# Patient Record
Sex: MALE | Race: WHITE | Employment: OTHER | URBAN - METROPOLITAN AREA
[De-identification: names, ages, dates, MRNs, and addresses within clinical notes are randomized per-mention and may not be internally consistent; named-entity substitution may affect disease eponyms.]

---

## 2021-06-06 ENCOUNTER — HOSPITAL ENCOUNTER (EMERGENCY)
Age: 46
Discharge: HOME | End: 2021-06-06
Attending: EMERGENCY MEDICINE

## 2021-06-06 ENCOUNTER — APPOINTMENT (OUTPATIENT)
Dept: GENERAL RADIOLOGY | Age: 46
End: 2021-06-06

## 2021-06-06 VITALS
TEMPERATURE: 98.1 F | SYSTOLIC BLOOD PRESSURE: 152 MMHG | WEIGHT: 250 LBS | HEIGHT: 72 IN | BODY MASS INDEX: 33.86 KG/M2 | RESPIRATION RATE: 18 BRPM | HEART RATE: 83 BPM | DIASTOLIC BLOOD PRESSURE: 94 MMHG | OXYGEN SATURATION: 97 %

## 2021-06-06 DIAGNOSIS — S60.459A FOREIGN BODY OF FINGER OF LEFT HAND, INITIAL ENCOUNTER: Primary | ICD-10-CM

## 2021-06-06 PROCEDURE — 73140 X-RAY EXAM OF FINGER(S): CPT

## 2021-06-06 PROCEDURE — 90471 IMMUNIZATION ADMIN: CPT | Performed by: EMERGENCY MEDICINE

## 2021-06-06 PROCEDURE — 6370000000 HC RX 637 (ALT 250 FOR IP): Performed by: EMERGENCY MEDICINE

## 2021-06-06 PROCEDURE — 6360000002 HC RX W HCPCS: Performed by: EMERGENCY MEDICINE

## 2021-06-06 PROCEDURE — 90715 TDAP VACCINE 7 YRS/> IM: CPT | Performed by: EMERGENCY MEDICINE

## 2021-06-06 PROCEDURE — 99283 EMERGENCY DEPT VISIT LOW MDM: CPT

## 2021-06-06 RX ORDER — BLOOD-GLUCOSE SENSOR
1 EACH MISCELLANEOUS
COMMUNITY
Start: 2020-12-30

## 2021-06-06 RX ORDER — LISINOPRIL 20 MG/1
20 TABLET ORAL DAILY
COMMUNITY
Start: 2020-12-29

## 2021-06-06 RX ORDER — ROSUVASTATIN CALCIUM 20 MG/1
20 TABLET, COATED ORAL DAILY
COMMUNITY
Start: 2020-12-30

## 2021-06-06 RX ORDER — GINSENG 100 MG
CAPSULE ORAL ONCE
Status: COMPLETED | OUTPATIENT
Start: 2021-06-06 | End: 2021-06-06

## 2021-06-06 RX ORDER — BLOOD-GLUCOSE TRANSMITTER
1 EACH MISCELLANEOUS DAILY
COMMUNITY
Start: 2020-12-30

## 2021-06-06 RX ADMIN — TETANUS TOXOID, REDUCED DIPHTHERIA TOXOID AND ACELLULAR PERTUSSIS VACCINE, ADSORBED 0.5 ML: 5; 2.5; 8; 8; 2.5 SUSPENSION INTRAMUSCULAR at 09:46

## 2021-06-06 RX ADMIN — BACITRACIN: 500 OINTMENT TOPICAL at 10:26

## 2021-06-06 ASSESSMENT — ENCOUNTER SYMPTOMS
NAUSEA: 0
SHORTNESS OF BREATH: 0
EYE REDNESS: 0
BACK PAIN: 0
VOMITING: 0
COUGH: 0
EYE DISCHARGE: 0
ABDOMINAL PAIN: 0
SORE THROAT: 0

## 2021-06-06 NOTE — ED PROVIDER NOTES
Past Surgical History:   Procedure Laterality Date    TONSILLECTOMY      age 6         CURRENT MEDICATIONS       Discharge Medication List as of 6/6/2021 10:30 AM      CONTINUE these medications which have NOT CHANGED    Details   rosuvastatin (CRESTOR) 20 MG tablet Take 20 mg by mouth dailyHistorical Med      metFORMIN (GLUCOPHAGE) 1000 MG tablet Take 1,000 mg by mouth 2 times daily (with meals)Historical Med      lisinopril (PRINIVIL;ZESTRIL) 20 MG tablet Take 20 mg by mouth dailyHistorical Med      Continuous Blood Gluc Sensor (DEXCOM G6 SENSOR) MISC 1 each by NOT APPLICABLE routeHistorical Med      Continuous Blood Gluc Transmit (DEXCOM G6 TRANSMITTER) MISC 1 each by NOT APPLICABLE route dailyHistorical Med      naproxen (NAPROSYN) 500 MG tablet Take 1 tablet by mouth 2 times daily, Disp-30 tablet, R-0             ALLERGIES     Patient has no known allergies. FAMILY HISTORY     No family history on file. SOCIAL HISTORY       Social History     Socioeconomic History    Marital status: Single     Spouse name: Not on file    Number of children: Not on file    Years of education: Not on file    Highest education level: Not on file   Occupational History    Not on file   Tobacco Use    Smoking status: Current Every Day Smoker     Packs/day: 0.50     Types: Cigarettes    Smokeless tobacco: Never Used   Substance and Sexual Activity    Alcohol use: Yes    Drug use: No    Sexual activity: Not on file   Other Topics Concern    Not on file   Social History Narrative    Not on file     Social Determinants of Health     Financial Resource Strain:     Difficulty of Paying Living Expenses:    Food Insecurity:     Worried About Running Out of Food in the Last Year:     [address] in the Last Year:    Transportation Needs:     Lack of Transportation (Medical):      Lack of Transportation (Non-Medical):    Physical Activity:     Days of Exercise per Week:     Minutes of Exercise per Session: Stress:     Feeling of Stress :    Social Connections:     Frequency of Communication with Friends and Family:     Frequency of Social Gatherings with Friends and Family:     Attends Muslim Services:     Active Member of Clubs or Organizations:     Attends Club or Organization Meetings:     Marital Status:    Intimate Partner Violence:     Fear of Current or Ex-Partner:     Emotionally Abused:     Physically Abused:     Sexually Abused:          PHYSICAL EXAM       ED Triage Vitals   BP Temp Temp Source Pulse Resp SpO2 Height Weight   06/06/21 0938 06/06/21 0936 06/06/21 0936 06/06/21 0936 06/06/21 0936 06/06/21 0936 06/06/21 0936 06/06/21 0936   (!) 152/94 98.1 °F (36.7 °C) Oral 83 18 97 % 6' (1.829 m) 250 lb (113.4 kg)       Physical Exam  Vitals and nursing note reviewed. Constitutional:       Appearance: Normal appearance. HENT:      Head: Normocephalic and atraumatic. Right Ear: Tympanic membrane normal.      Left Ear: Tympanic membrane normal.      Nose: Nose normal.      Mouth/Throat:      Mouth: Mucous membranes are moist.      Pharynx: Oropharynx is clear. Eyes:      General: Lids are normal.      Extraocular Movements: Extraocular movements intact. Conjunctiva/sclera: Conjunctivae normal.      Pupils: Pupils are equal, round, and reactive to light. Cardiovascular:      Rate and Rhythm: Normal rate and regular rhythm. Pulses: Normal pulses. Heart sounds: Normal heart sounds. Pulmonary:      Effort: Pulmonary effort is normal.      Breath sounds: Normal breath sounds. Abdominal:      General: Abdomen is flat. Bowel sounds are normal.      Palpations: Abdomen is soft. Musculoskeletal:         General: Normal range of motion. Cervical back: Full passive range of motion without pain, normal range of motion and neck supple. Skin:     General: Skin is warm. Capillary Refill: Capillary refill takes less than 2 seconds.       Findings: Lesion and wound present. Comments: Fish hook in the left finger   Neurological:      General: No focal deficit present. Mental Status: He is alert and oriented to person, place, and time. Deep Tendon Reflexes: Reflexes are normal and symmetric. Psychiatric:         Attention and Perception: Attention and perception normal.         Mood and Affect: Mood normal.         Behavior: Behavior normal. Behavior is cooperative. LABS:  Labs Reviewed - No data to display      MDM:   Vitals:    Vitals:    06/06/21 0936 06/06/21 0938   BP:  (!) 152/94   Pulse: 83    Resp: 18    Temp: 98.1 °F (36.7 °C)    TempSrc: Oral    SpO2: 97%    Weight: 250 lb (113.4 kg)    Height: 6' (1.829 m)        MDM  Number of Diagnoses or Management Options  Foreign body of finger of left hand, initial encounter  Diagnosis management comments: Patient presents with a fishing hook stuck in his finger. Xray of the finger: no acute fracture foreign body present   The fishing hook was removed. He was dressed and cleaned the wound. He will be discharged home. He will follow up in 2 days with his primary care doctor. Abisai Menard DO     The lab results, radiology and test results were reviewed with the patient and family. The patient will follow up in 2 days with their primary care doctor. If their symptoms change or get worse they will return to the ER. CRITICAL CARE TIME   Total CriticalCare time was 0 minutes, excluding separately reportable procedures. There was a high probability of clinically significant/life threatening deterioration in the patient's condition which required my urgent intervention. PROCEDURES:  Unlessotherwise noted below, none     Foreign Body    Date/Time: 6/6/2021 10:31 AM  Performed by: Cassidy Rae DO  Authorized by:  Cassidy Rae DO     Consent:     Consent obtained:  Verbal    Consent given by:  Patient    Risks discussed:  Incomplete removal    Alternatives discussed:  No treatment, alternative treatment and observation  Location:     Location:  Finger    Finger location:  L index finger    Depth:  Subcutaneous    Tendon involvement:  None  Pre-procedure details:     Imaging:  X-ray    Neurovascular status: intact      Preparation: Patient was prepped and draped in usual sterile fashion    Anesthesia (see MAR for exact dosages): Anesthesia method:  Local infiltration    Local anesthetic:  Lidocaine 1% w/o epi  Procedure type:     Procedure complexity:  Simple  Procedure details:     Localization method:  Probed and finder needle    Dissection of underlying tissues: no      Bloodless field: yes      Removal mechanism: Forceps    Foreign bodies recovered:  1    Intact foreign body removal: yes    Post-procedure details:     Neurovascular status: intact      Confirmation:  No additional foreign bodies on visualization    Skin closure:  None    Dressing:  Antibiotic ointment and non-adherent dressing    Patient tolerance of procedure: Tolerated well, no immediate complications          FINAL IMPRESSION      1.  Foreign body of finger of left hand, initial encounter          DISPOSITION/PLAN   DISPOSITION            Bjorn Montejo DO (electronically signed)  Attending Emergency Physician          Abran Austin DO  06/09/21 0604

## 2021-06-06 NOTE — ED TRIAGE NOTES
Patient was fishing and caught a bass on his luer when he went to take it off the hook the luer went into his left index finger and one treble hook is stuck between about an inch from the top of his finger. This happened about an hour ago.

## 2021-06-06 NOTE — ED NOTES
Treble hook removed from left index finger per Dr. Manuel Early, patient soaking left index finger in Dynahex at this time.       Yordan Moreland RN  06/06/21 1019

## 2021-06-06 NOTE — ED NOTES
DSD applied to left index finger. MSP intact before and after application. Bleeding controlled.       Julio Kim RN  06/06/21 5818

## 2023-11-25 ENCOUNTER — HOSPITAL ENCOUNTER (EMERGENCY)
Facility: HOSPITAL | Age: 48
Discharge: HOME | End: 2023-11-26
Attending: INTERNAL MEDICINE
Payer: COMMERCIAL

## 2023-11-25 ENCOUNTER — APPOINTMENT (OUTPATIENT)
Dept: RADIOLOGY | Facility: HOSPITAL | Age: 48
End: 2023-11-25
Payer: COMMERCIAL

## 2023-11-25 DIAGNOSIS — F10.920 ALCOHOLIC INTOXICATION WITHOUT COMPLICATION (CMS-HCC): ICD-10-CM
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
## 2023-11-25 LAB
ALBUMIN SERPL BCP-MCNC: 4.6 G/DL (ref 3.4–5)
ALP SERPL-CCNC: 59 U/L (ref 33–120)
ALT SERPL W P-5'-P-CCNC: 51 U/L (ref 10–52)
ANION GAP BLDV CALCULATED.4IONS-SCNC: 8 MMOL/L (ref 10–25)
ANION GAP SERPL CALC-SCNC: 17 MMOL/L (ref 10–20)
AST SERPL W P-5'-P-CCNC: 25 U/L (ref 9–39)
BASE EXCESS BLDV CALC-SCNC: -0.4 MMOL/L (ref -2–3)
BASOPHILS # BLD AUTO: 0.04 X10*3/UL (ref 0–0.1)
BASOPHILS NFR BLD AUTO: 0.6 %
BILIRUB SERPL-MCNC: 0.3 MG/DL (ref 0–1.2)
BODY TEMPERATURE: 37 DEGREES CELSIUS
BUN SERPL-MCNC: 16 MG/DL (ref 6–23)
CA-I BLDV-SCNC: 1.16 MMOL/L (ref 1.1–1.33)
CALCIUM SERPL-MCNC: 9.1 MG/DL (ref 8.6–10.3)
CARDIAC TROPONIN I PNL SERPL HS: 5 NG/L (ref 0–20)
CHLORIDE BLDV-SCNC: 104 MMOL/L (ref 98–107)
CHLORIDE SERPL-SCNC: 101 MMOL/L (ref 98–107)
CO2 SERPL-SCNC: 21 MMOL/L (ref 21–32)
CREAT SERPL-MCNC: 0.88 MG/DL (ref 0.5–1.3)
EOSINOPHIL # BLD AUTO: 0.1 X10*3/UL (ref 0–0.7)
EOSINOPHIL NFR BLD AUTO: 1.6 %
ERYTHROCYTE [DISTWIDTH] IN BLOOD BY AUTOMATED COUNT: 12.1 % (ref 11.5–14.5)
GFR SERPL CREATININE-BSD FRML MDRD: >90 ML/MIN/1.73M*2
GLUCOSE BLDV-MCNC: 347 MG/DL (ref 74–99)
GLUCOSE SERPL-MCNC: 315 MG/DL (ref 74–99)
HCO3 BLDV-SCNC: 25.9 MMOL/L (ref 22–26)
HCT VFR BLD AUTO: 45.1 % (ref 41–52)
HCT VFR BLD EST: ABNORMAL %
HGB BLD-MCNC: 15.5 G/DL (ref 13.5–17.5)
HGB BLDV-MCNC: ABNORMAL G/DL
IMM GRANULOCYTES # BLD AUTO: 0.04 X10*3/UL (ref 0–0.7)
IMM GRANULOCYTES NFR BLD AUTO: 0.6 % (ref 0–0.9)
INHALED O2 CONCENTRATION: 21 %
LACTATE BLDV-SCNC: 2.9 MMOL/L (ref 0.4–2)
LYMPHOCYTES # BLD AUTO: 2.87 X10*3/UL (ref 1.2–4.8)
LYMPHOCYTES NFR BLD AUTO: 45.9 %
MAGNESIUM SERPL-MCNC: 1.9 MG/DL (ref 1.6–2.4)
MCH RBC QN AUTO: 28.6 PG (ref 26–34)
MCHC RBC AUTO-ENTMCNC: 34.4 G/DL (ref 32–36)
MCV RBC AUTO: 83 FL (ref 80–100)
MONOCYTES # BLD AUTO: 0.37 X10*3/UL (ref 0.1–1)
MONOCYTES NFR BLD AUTO: 5.9 %
NEUTROPHILS # BLD AUTO: 2.83 X10*3/UL (ref 1.2–7.7)
NEUTROPHILS NFR BLD AUTO: 45.4 %
NRBC BLD-RTO: 0 /100 WBCS (ref 0–0)
OXYHGB MFR BLDV: ABNORMAL %
PCO2 BLDV: 48 MM HG (ref 41–51)
PH BLDV: 7.34 PH (ref 7.33–7.43)
PLATELET # BLD AUTO: 156 X10*3/UL (ref 150–450)
PO2 BLDV: 56 MM HG (ref 35–45)
POTASSIUM BLDV-SCNC: 5.2 MMOL/L (ref 3.5–5.3)
POTASSIUM SERPL-SCNC: 4.4 MMOL/L (ref 3.5–5.3)
PROT SERPL-MCNC: 6.8 G/DL (ref 6.4–8.2)
RBC # BLD AUTO: 5.42 X10*6/UL (ref 4.5–5.9)
SAO2 % BLDV: ABNORMAL %
SODIUM BLDV-SCNC: 133 MMOL/L (ref 136–145)
SODIUM SERPL-SCNC: 135 MMOL/L (ref 136–145)
WBC # BLD AUTO: 6.3 X10*3/UL (ref 4.4–11.3)

## 2023-11-25 PROCEDURE — 96361 HYDRATE IV INFUSION ADD-ON: CPT

## 2023-11-25 PROCEDURE — 99285 EMERGENCY DEPT VISIT HI MDM: CPT | Performed by: INTERNAL MEDICINE

## 2023-11-25 PROCEDURE — 82077 ASSAY SPEC XCP UR&BREATH IA: CPT | Performed by: GENERAL PRACTICE

## 2023-11-25 PROCEDURE — 2500000004 HC RX 250 GENERAL PHARMACY W/ HCPCS (ALT 636 FOR OP/ED): Performed by: GENERAL PRACTICE

## 2023-11-25 PROCEDURE — 83735 ASSAY OF MAGNESIUM: CPT | Performed by: GENERAL PRACTICE

## 2023-11-25 PROCEDURE — 36415 COLL VENOUS BLD VENIPUNCTURE: CPT | Performed by: GENERAL PRACTICE

## 2023-11-25 PROCEDURE — 96374 THER/PROPH/DIAG INJ IV PUSH: CPT

## 2023-11-25 PROCEDURE — 80053 COMPREHEN METABOLIC PANEL: CPT | Performed by: GENERAL PRACTICE

## 2023-11-25 PROCEDURE — 84484 ASSAY OF TROPONIN QUANT: CPT | Performed by: GENERAL PRACTICE

## 2023-11-25 PROCEDURE — 82435 ASSAY OF BLOOD CHLORIDE: CPT | Mod: 59 | Performed by: GENERAL PRACTICE

## 2023-11-25 PROCEDURE — 85025 COMPLETE CBC W/AUTO DIFF WBC: CPT | Performed by: GENERAL PRACTICE

## 2023-11-25 PROCEDURE — 71045 X-RAY EXAM CHEST 1 VIEW: CPT | Mod: FOREIGN READ | Performed by: RADIOLOGY

## 2023-11-25 PROCEDURE — 71045 X-RAY EXAM CHEST 1 VIEW: CPT | Mod: FY

## 2023-11-25 RX ORDER — ONDANSETRON HYDROCHLORIDE 2 MG/ML
4 INJECTION, SOLUTION INTRAVENOUS ONCE
Status: COMPLETED | OUTPATIENT
Start: 2023-11-25 | End: 2023-11-25

## 2023-11-25 RX ADMIN — ONDANSETRON 4 MG: 2 INJECTION, SOLUTION INTRAMUSCULAR; INTRAVENOUS at 23:45

## 2023-11-25 ASSESSMENT — COLUMBIA-SUICIDE SEVERITY RATING SCALE - C-SSRS
2. HAVE YOU ACTUALLY HAD ANY THOUGHTS OF KILLING YOURSELF?: NO
1. IN THE PAST MONTH, HAVE YOU WISHED YOU WERE DEAD OR WISHED YOU COULD GO TO SLEEP AND NOT WAKE UP?: NO
6. HAVE YOU EVER DONE ANYTHING, STARTED TO DO ANYTHING, OR PREPARED TO DO ANYTHING TO END YOUR LIFE?: NO

## 2023-11-25 ASSESSMENT — PAIN SCALES - GENERAL: PAINLEVEL_OUTOF10: 0 - NO PAIN

## 2023-11-25 ASSESSMENT — PAIN - FUNCTIONAL ASSESSMENT: PAIN_FUNCTIONAL_ASSESSMENT: 0-10

## 2023-11-26 ENCOUNTER — APPOINTMENT (OUTPATIENT)
Dept: RADIOLOGY | Facility: HOSPITAL | Age: 48
End: 2023-11-26
Payer: COMMERCIAL

## 2023-11-26 VITALS
BODY MASS INDEX: 25.84 KG/M2 | TEMPERATURE: 96.4 F | HEIGHT: 73 IN | SYSTOLIC BLOOD PRESSURE: 155 MMHG | HEART RATE: 88 BPM | OXYGEN SATURATION: 96 % | DIASTOLIC BLOOD PRESSURE: 97 MMHG | WEIGHT: 195 LBS | RESPIRATION RATE: 16 BRPM

## 2023-11-26 LAB
CARDIAC TROPONIN I PNL SERPL HS: 4 NG/L (ref 0–20)
ETHANOL SERPL-MCNC: 230 MG/DL
LACTATE BLDV-SCNC: 3 MMOL/L (ref 0.4–2)

## 2023-11-26 PROCEDURE — 2500000004 HC RX 250 GENERAL PHARMACY W/ HCPCS (ALT 636 FOR OP/ED): Performed by: EMERGENCY MEDICINE

## 2023-11-26 PROCEDURE — 83605 ASSAY OF LACTIC ACID: CPT | Performed by: GENERAL PRACTICE

## 2023-11-26 PROCEDURE — 72125 CT NECK SPINE W/O DYE: CPT | Performed by: RADIOLOGY

## 2023-11-26 PROCEDURE — 70450 CT HEAD/BRAIN W/O DYE: CPT | Performed by: RADIOLOGY

## 2023-11-26 PROCEDURE — 84484 ASSAY OF TROPONIN QUANT: CPT | Performed by: GENERAL PRACTICE

## 2023-11-26 PROCEDURE — 70450 CT HEAD/BRAIN W/O DYE: CPT

## 2023-11-26 PROCEDURE — 72125 CT NECK SPINE W/O DYE: CPT

## 2023-11-26 PROCEDURE — 36415 COLL VENOUS BLD VENIPUNCTURE: CPT | Performed by: GENERAL PRACTICE

## 2023-11-26 RX ADMIN — SODIUM CHLORIDE, SODIUM LACTATE, POTASSIUM CHLORIDE, AND CALCIUM CHLORIDE 1000 ML: 600; 310; 30; 20 INJECTION, SOLUTION INTRAVENOUS at 00:55

## 2023-11-26 ASSESSMENT — PAIN - FUNCTIONAL ASSESSMENT: PAIN_FUNCTIONAL_ASSESSMENT: 0-10

## 2023-11-26 ASSESSMENT — PAIN SCALES - GENERAL: PAINLEVEL_OUTOF10: 0 - NO PAIN

## 2023-11-26 NOTE — ED PROVIDER NOTES
HPI   Chief Complaint   Patient presents with    Fall     Patient arrived via ambulance complaining of a fall after getting diz[place]  He denies any blood thinners but states he did lose consciousness.  He has no pain at this time.       HPI: 48-year-old male with a history of diabetes presents following a fall while intoxicated.  He was at a concert [employer] and states that he felt lightheaded when he fell backwards and struck the back of his head.  He is not on anticoagulants.  He does admit to drinking 8 beers.  As per EMS his glucose was found to be in the 400s.  He is denying headache, chest pain, shortness of breath and states that he feels well.      Limitations to history: None  Independent Historians: Patient, EMS  External Records Reviewed: HIE, outpatient notes, inpatient notes  ------------------------------------------------------------------------------------------------------------------------------------------  ROS: a ten point review of systems was performed and was negative except as per HPI.  ------------------------------------------------------------------------------------------------------------------------------------------  PMH / PSH: as per HPI, otherwise reviewed in EMR  MEDS: as per HPI, otherwise reviewed in EMR  ALLERGIES: as per HPI, otherwise reviewed in EMR  SocH:  as per HPI, otherwise reviewed in EMR  FH:  as per HPI, otherwise reviewed in EMR  ------------------------------------------------------------------------------------------------------------------------------------------  Physical Exam:  VS: As documented in the triage note and EMR flowsheet from this visit was reviewed  General: Well appearing. No acute distress.   Eyes:  Extraocular movements grossly intact. No scleral icterus. No discharge  HEENT:  Normocephalic.  Atraumatic  Neck: Patient is in a c-collar  CV: Regular rhythm. No murmurs, rubs or gallops   Resp: Clear to auscultation  bilaterally. No respiratory distress.    GI: Soft, no masses, nontender. No rebound tenderness or guarding  MSK: Symmetric muscle bulk. No deformities. No lower extremity edema.    Skin: Warm, dry, intact.   Neuro: No focal deficits.  A&O x3.   Psych: Intoxicated but pleasant and conversive ------------------------------------------------------------------------------------------------------------------------------------------  Hospital Course / Medical Decision Making:  Independent Interpretations: NA  EKG as interpreted by me: Normal sinus rhythm at 86 bpm with a normal axis, no bundle branch block and no signs of acute ischemia    MDM: This is a 48-year-old male with a history of diabetes presenting after falling down at a concert.  He does admit to drinking 8 beers.  In the ED he is intoxicated but pleasant and conversive.  He presents in a c-collar.  His glucose is in the 300s and he is currently not acidotic.  Anion gap not elevated.  I do not feel there is need for insulin at this time.  Patient was signed out to the nighttime physician, Dr. Lux, pending imaging and reevaluation and ultimate disposition.    Discussion of Management with Other Providers:   I discussed the patient/results with: Emergency medicine team    Final diagnosis and disposition as below.    Labs Reviewed  COMPREHENSIVE METABOLIC PANEL - Abnormal     Glucose                       315 (*)                Sodium                        135 (*)                Potassium                     4.4                    Chloride                      101                    Bicarbonate                   21                     Anion Gap                     17                     Urea Nitrogen                 16                     Creatinine                    0.88                   eGFR                          >90                    Calcium                       9.1                    Albumin                       4.6                     Alkaline Phosphatase          59                     Total Protein                 6.8                    AST                           25                     Bilirubin, Total              0.3                    ALT                           51                  BLOOD GAS VENOUS FULL PANEL - Abnormal     POCT pH, Venous               7.34                   POCT pCO2, Venous             48                     POCT pO2, Venous              56 (*)                 POCT SO2, Venous                                     POCT Oxy Hemoglobin, Venous                          POCT Hematocrit Calculated, Venous                          POCT Sodium, Venous           133 (*)                POCT Potassium, Venous        5.2                    POCT Chloride, Venous         104                    POCT Ionized Calicum, Venous   1.16                   POCT Glucose, Venous          347 (*)                POCT Lactate, Venous          2.9 (*)                POCT Base Excess, Venous      -0.4                   POCT HCO3 Calculated, Venous   25.9                   POCT Hemoglobin, Venous                              POCT Anion Gap, Venous        8.0 (*)                Patient Temperature           37.0                   FiO2                          21                  BLOOD GAS LACTIC ACID, VENOUS - Abnormal     POCT Lactate, Venous          3.0 (*)             ALCOHOL - Abnormal     Alcohol                       230 (*)             MAGNESIUM - Normal     Magnesium                     1.90                SERIAL TROPONIN-INITIAL - Normal     Troponin I, High Sensitivity   5                          Narrative: Less than 99th percentile of normal range cutoff-                  Female and children under 18 years old <14 ng/L; Male <21 ng/L: Negative                  Repeat testing should be performed if clinically indicated.                                     Female and children under 18 years old 14-50 ng/L; Male 21-50 ng/L:                   Consistent with possible cardiac damage and possible increased clinical                   risk. Serial measurements may help to assess extent of myocardial damage.                                     >50 ng/L: Consistent with cardiac damage, increased clinical risk and                  myocardial infarction. Serial measurements may help assess extent of                   myocardial damage.                                      NOTE: Children less than 1 year old may have higher baseline troponin                   levels and results should be interpreted in conjunction with the overall                   clinical context.                                     NOTE: Troponin I testing is performed using a different                   testing methodology at [facility] than at other                   [facility]. Direct result comparisons should only                   be made within the same method.  SERIAL TROPONIN, 1 HOUR - Normal     Troponin I, High Sensitivity   4                          Narrative: Less than 99th percentile of normal range cutoff-                  Female and children under 18 years old <14 ng/L; Male <21 ng/L: Negative                  Repeat testing should be performed if clinically indicated.                                     Female and children under 18 years old 14-50 ng/L; Male 21-50 ng/L:                  Consistent with possible cardiac damage and possible increased clinical                   risk. Serial measurements may help to assess extent of myocardial damage.                                     >50 ng/L: Consistent with cardiac damage, increased clinical risk and                  myocardial infarction. Serial measurements may help assess extent of                   myocardial damage.                                      NOTE: Children less than 1 year old may have higher baseline troponin                   levels and results should be interpreted in conjunction with  the overall                   clinical context.                                     NOTE: Troponin I testing is performed using a different                   testing methodology at [facility] than at other                   system hospitals. Direct result comparisons should only                   be made within the same method.  TROPONIN SERIES- (INITIAL, 1 HR)         Narrative: The following orders were created for panel order Troponin I Series, High Sensitivity (0, 1 HR).                  Procedure                               Abnormality         Status                                     ---------                               -----------         ------                                     Troponin I, High Sensiti...[[id number]]  Normal              Final result                               Troponin, High Sensitivi...[[id number]]  Normal              Final result                                                 Please view results for these tests on the individual orders.  CBC WITH AUTO DIFFERENTIAL                            Kress Coma Scale Score: 15                  Patient History   No past medical history on file.  No past surgical history on file.  No family history on file.  Social History     Tobacco Use    Smoking status: Not on file    Smokeless tobacco: Not on file   Substance Use Topics    Alcohol use: Not on file    Drug use: Not on file       Physical Exam   ED Triage Vitals [11/25/23 2230]   Temp Heart Rate Resp BP   35.8 °C (96.4 °F) 85 16 (!) 170/101      SpO2 Temp Source Heart Rate Source Patient Position   95 % Temporal Monitor Lying      BP Location FiO2 (%)     Left arm --       Physical Exam    ED Course & MDM   Diagnoses as of 12/01/23 1624   Fall, initial encounter   Alcoholic intoxication without complication (CMS/[facility])       Medical Decision Making      Procedure  Procedures     Estuardo Pinto,   12/01/23 1624

## 2023-11-26 NOTE — DISCHARGE INSTRUCTIONS
You were seen in the emergency department after you fell while intoxicated.  Your CTs showed no evidence of traumatic injury.  Monitor symptoms closely.  Drink plenty of fluids to stay hydrated.  Reduce amount of alcohol you consume at one time. Follow-up with your primary doctor.

## 2023-11-26 NOTE — PROGRESS NOTES
I received Get Hernandez in signout from Dr. Pinto at 00:00.  Please see the previous note for all HPI, PE, ED Course and MDM up until the time of signout.    Briefly, this is a 48-year-old gentleman who has a history of diabetes who presented to the emergency department after he fell while intoxicated. Was at a concert and shared that he drank 8 beers before falling down. Not on anticoagulation. Labs notable for hyperglycemia to 300 without evidence of DKA. Lactate slightly elevated which I suspect is secondary to excessive alcohol intake. CT head and C-spine ordered. These are pending at signout.     Imaging shows no significant acute injury. He received IV fluids. On reevaluation he remains stable and is eager to go home. He is fully oriented, ambulating with a steady gait, and does not appear to be a threat to himself or others. He is unable to locate his phone but we will help arrange for a ride home. He was advised to closely monitor glucose levels and follow up with his primary doctor should they continue to be elevated. Given strict return precautions.     Signed by TATIANA MOYA MD

## 2023-12-06 PROBLEM — E11.65 TYPE 2 DIABETES MELLITUS WITH HYPERGLYCEMIA, WITHOUT LONG-TERM CURRENT USE OF INSULIN (MULTI): Status: ACTIVE | Noted: 2019-10-11

## 2023-12-06 PROBLEM — I10 ESSENTIAL HYPERTENSION: Status: ACTIVE | Noted: 2019-10-11

## 2023-12-06 RX ORDER — ATORVASTATIN CALCIUM 40 MG/1
1 TABLET, FILM COATED ORAL NIGHTLY
COMMUNITY
Start: 2023-10-12 | End: 2023-12-12 | Stop reason: SDUPTHER

## 2023-12-06 RX ORDER — LISINOPRIL 20 MG/1
1 TABLET ORAL DAILY
COMMUNITY
Start: 2020-12-29 | End: 2023-12-12 | Stop reason: SDUPTHER

## 2023-12-12 ENCOUNTER — OFFICE VISIT (OUTPATIENT)
Dept: PRIMARY CARE | Facility: CLINIC | Age: 48
End: 2023-12-12
Payer: COMMERCIAL

## 2023-12-12 VITALS
DIASTOLIC BLOOD PRESSURE: 96 MMHG | HEART RATE: 81 BPM | OXYGEN SATURATION: 98 % | HEIGHT: 72 IN | WEIGHT: 210 LBS | BODY MASS INDEX: 28.44 KG/M2 | TEMPERATURE: 96.9 F | RESPIRATION RATE: 18 BRPM | SYSTOLIC BLOOD PRESSURE: 126 MMHG

## 2023-12-12 DIAGNOSIS — R80.9 TYPE 2 DIABETES MELLITUS WITH MICROALBUMINURIA, WITHOUT LONG-TERM CURRENT USE OF INSULIN (MULTI): ICD-10-CM

## 2023-12-12 DIAGNOSIS — N52.9 VASCULOGENIC ERECTILE DYSFUNCTION, UNSPECIFIED VASCULOGENIC ERECTILE DYSFUNCTION TYPE: ICD-10-CM

## 2023-12-12 DIAGNOSIS — I15.2 HYPERTENSION ASSOCIATED WITH TYPE 2 DIABETES MELLITUS (MULTI): Primary | ICD-10-CM

## 2023-12-12 DIAGNOSIS — Z12.11 COLON CANCER SCREENING: ICD-10-CM

## 2023-12-12 DIAGNOSIS — E78.5 HYPERLIPIDEMIA ASSOCIATED WITH TYPE 2 DIABETES MELLITUS (MULTI): ICD-10-CM

## 2023-12-12 DIAGNOSIS — Z00.00 ANNUAL PHYSICAL EXAM: ICD-10-CM

## 2023-12-12 DIAGNOSIS — Z12.5 SCREENING FOR PROSTATE CANCER: ICD-10-CM

## 2023-12-12 DIAGNOSIS — E11.29 TYPE 2 DIABETES MELLITUS WITH MICROALBUMINURIA, WITHOUT LONG-TERM CURRENT USE OF INSULIN (MULTI): ICD-10-CM

## 2023-12-12 DIAGNOSIS — E11.69 HYPERLIPIDEMIA ASSOCIATED WITH TYPE 2 DIABETES MELLITUS (MULTI): ICD-10-CM

## 2023-12-12 DIAGNOSIS — E11.65 TYPE 2 DIABETES MELLITUS WITH HYPERGLYCEMIA, WITH LONG-TERM CURRENT USE OF INSULIN (MULTI): ICD-10-CM

## 2023-12-12 DIAGNOSIS — E78.49 OTHER HYPERLIPIDEMIA: ICD-10-CM

## 2023-12-12 DIAGNOSIS — I10 ESSENTIAL HYPERTENSION: ICD-10-CM

## 2023-12-12 DIAGNOSIS — E11.59 HYPERTENSION ASSOCIATED WITH TYPE 2 DIABETES MELLITUS (MULTI): Primary | ICD-10-CM

## 2023-12-12 DIAGNOSIS — Z79.4 TYPE 2 DIABETES MELLITUS WITH HYPERGLYCEMIA, WITH LONG-TERM CURRENT USE OF INSULIN (MULTI): ICD-10-CM

## 2023-12-12 DIAGNOSIS — Z23 NEED FOR SHINGLES VACCINE: ICD-10-CM

## 2023-12-12 PROCEDURE — 90750 HZV VACC RECOMBINANT IM: CPT | Performed by: PHYSICIAN ASSISTANT

## 2023-12-12 PROCEDURE — 1036F TOBACCO NON-USER: CPT | Performed by: PHYSICIAN ASSISTANT

## 2023-12-12 PROCEDURE — 3074F SYST BP LT 130 MM HG: CPT | Performed by: PHYSICIAN ASSISTANT

## 2023-12-12 PROCEDURE — 90471 IMMUNIZATION ADMIN: CPT | Performed by: PHYSICIAN ASSISTANT

## 2023-12-12 PROCEDURE — 3080F DIAST BP >= 90 MM HG: CPT | Performed by: PHYSICIAN ASSISTANT

## 2023-12-12 PROCEDURE — 99386 PREV VISIT NEW AGE 40-64: CPT | Performed by: PHYSICIAN ASSISTANT

## 2023-12-12 PROCEDURE — 4010F ACE/ARB THERAPY RXD/TAKEN: CPT | Performed by: PHYSICIAN ASSISTANT

## 2023-12-12 RX ORDER — TIRZEPATIDE 2.5 MG/.5ML
2.5 INJECTION, SOLUTION SUBCUTANEOUS
Qty: 2 ML | Refills: 0 | Status: SHIPPED | OUTPATIENT
Start: 2023-12-12 | End: 2024-01-30 | Stop reason: DRUGHIGH

## 2023-12-12 RX ORDER — INSULIN GLARGINE 100 [IU]/ML
20 INJECTION, SOLUTION SUBCUTANEOUS EVERY MORNING
Qty: 9 ML | Refills: 1 | Status: SHIPPED | OUTPATIENT
Start: 2023-12-12 | End: 2024-05-16 | Stop reason: SDUPTHER

## 2023-12-12 RX ORDER — LISINOPRIL 20 MG/1
20 TABLET ORAL DAILY
Qty: 90 TABLET | Refills: 1 | Status: SHIPPED | OUTPATIENT
Start: 2023-12-12 | End: 2024-01-30 | Stop reason: SINTOL

## 2023-12-12 RX ORDER — DAPAGLIFLOZIN 10 MG/1
TABLET, FILM COATED ORAL
Qty: 90 TABLET | Refills: 1 | Status: SHIPPED | OUTPATIENT
Start: 2023-12-12 | End: 2024-05-16 | Stop reason: SDUPTHER

## 2023-12-12 RX ORDER — ATORVASTATIN CALCIUM 40 MG/1
40 TABLET, FILM COATED ORAL NIGHTLY
Qty: 90 TABLET | Refills: 1 | Status: SHIPPED | OUTPATIENT
Start: 2023-12-12 | End: 2024-05-16 | Stop reason: SDUPTHER

## 2023-12-12 RX ORDER — INSULIN GLARGINE 100 [IU]/ML
20 INJECTION, SOLUTION SUBCUTANEOUS EVERY MORNING
COMMUNITY
End: 2023-12-12 | Stop reason: SDUPTHER

## 2023-12-12 RX ORDER — SILDENAFIL 50 MG/1
50 TABLET, FILM COATED ORAL DAILY PRN
Qty: 12 TABLET | Refills: 3 | Status: SHIPPED | OUTPATIENT
Start: 2023-12-12 | End: 2024-01-08 | Stop reason: SDUPTHER

## 2023-12-12 NOTE — PROGRESS NOTES
"Subjective   Patient ID: Get Hernandez is a 48 y.o. male who presents for Establish Care (New pt here today to [facility]; previously seen doctor where he lived in [place].  Pt needs his meds refilled-pending (wanting 90 day on all, please put in for the Basaglar)  for DM2, HTN, Hyperlipidemia. Has been out of meds for a little over 30 days. ).    HPI     Preventive:   - Lives at home in [facility] with fijeffery - home life is good - got  last year and met marija who knew from high school     - Employment -  - \"it's going\"   - Labs: last A1c was 7.2% with just diet   - PSA: DUE   - Colon CA: DUE   - Flu: DECLINED  - Shingrix: DUE   - Td: UTD   - COVID-19 vax: DECLINED   - Lung CA screen (Smoker): DUE, DECLINED, former 46 pk/yr   - Diet: getting it together, high protein diet, occasional cheating, very low sugar and carbs   - Exercise: trying to walk 2 miles a day   - Sexual hx: active with fiance as much as possible   - Tobacco: former   - Illicit drugs: no   - Alcohol: no   - Mood: somewhat depressed due to job, impotence, off meds - frustrated      Review of Systems   HENT:  Negative for congestion, ear pain, hearing loss and rhinorrhea.    Eyes:  Negative for pain and visual disturbance.   Respiratory:  Negative for cough and shortness of breath.    Cardiovascular:  Negative for chest pain.   Gastrointestinal:  Negative for abdominal pain, constipation, diarrhea, nausea and vomiting.   Musculoskeletal:  Negative for arthralgias and back pain.   Skin:  Negative for rash.     Objective   BP (!) 126/96   Pulse 81   Temp 36.1 °C (96.9 °F)   Resp 18   Ht 1.816 m (5' 11.5\")   Wt 95.3 kg (210 lb)   SpO2 98%   BMI 28.88 kg/m²     Physical Exam  Constitutional:       General: He is not in acute distress.     Appearance: Normal appearance.   HENT:      Head: Normocephalic.      Right Ear: Tympanic membrane and ear canal normal.      Left Ear: Tympanic membrane and ear canal normal.      " Nose: Nose normal.      Mouth/Throat:      Mouth: Mucous membranes are moist.      Pharynx: Oropharynx is clear.   Eyes:      Extraocular Movements: Extraocular movements intact.      Conjunctiva/sclera: Conjunctivae normal.      Pupils: Pupils are equal, round, and reactive to light.   Cardiovascular:      Rate and Rhythm: Normal rate and regular rhythm.      Pulses: Normal pulses.      Heart sounds: No murmur heard.  Pulmonary:      Effort: Pulmonary effort is normal.      Breath sounds: Normal breath sounds. No wheezing, rhonchi or rales.   Abdominal:      General: Bowel sounds are normal. There is no distension.      Palpations: Abdomen is soft. There is no mass.      Tenderness: There is no abdominal tenderness. There is no guarding.   Musculoskeletal:         General: Normal range of motion.      Cervical back: Neck supple.   Lymphadenopathy:      Cervical: No cervical adenopathy.   Skin:     General: Skin is warm and dry.      Findings: No lesion or rash.   Neurological:      General: No focal deficit present.      Mental Status: He is alert.      Gait: Gait normal.   Psychiatric:         Mood and Affect: Mood normal.       Assessment/Plan     Problem List Items Addressed This Visit       Type 2 diabetes mellitus with hyperglycemia, with long-term current use of insulin (CMS/[facility])    Overview     - Most recent A1c was 7.2% (12/29/20)   - Currently on Basaglar 20U  - Historical med: metformin (s/e significant GI upset)  - Most recent eGFR was > 90 (11/25/23)  - Most recent UACr was 84 (1/20/23)   - Pt is on ACE-I - lisinopril 20 mg  - Pt is on statin - atorvastatin 40 mg   - NOT on aspirin          Current Assessment & Plan     - Discussed tx options with the pt   - He does have microalbuminuria so will add Kerendia - will need to check metabolic panel in 1 week   - Will also add Farxiga 10 mg and Mounjaro 2.5 mg qwk - may be able to taper off the Basaglar   - Referred to [facility] pharmacist   - Follow up with me in  3 months with labs prior         Relevant Medications    atorvastatin (Lipitor) 40 mg tablet    insulin glargine (Basaglar KwikPen U-100 Insulin) 100 unit/mL (3 mL) pen    lisinopril 20 mg tablet    Other Relevant Orders    Albumin , Urine Random    Hemoglobin A1C    Lipid Panel    C-Peptide    Follow Up In [facility] - Pharmacy    Follow Up In [facility] - PCP    Hypertension associated with type 2 diabetes mellitus (CMS/HCC) - Primary    Overview     - On ACE-I - lisinopril 20 mg         Current Assessment & Plan     - BP high today 126/96 mmHg   - Will continue to monitor BP after addition of the Farxiga          Relevant Medications    lisinopril 20 mg tablet    Other Relevant Orders    Follow Up In [facility] - PCP    Hyperlipidemia associated with type 2 diabetes mellitus (CMS/HCC)    Overview     - On statin - Lipitor 40 mg          Current Assessment & Plan     - Due for labs - ordered today   - For now, continue current tx plan         Relevant Medications    atorvastatin (Lipitor) 40 mg tablet    Annual physical exam    Current Assessment & Plan     PREVENTIVE CARE SCREENING:  - Home life is good, lives in [facility] with fiance   - Work life is stressful -    - Labs:  DUE, ordered today   - PSA (men 50-59, q2 yrs): DUE, ordered   - Cologuard/ Colonoscopy: DUE  Vaccines:   - Flu: DUE, DECLINED  - Shingles Vaccine (start at 50): Updated today   - Tetanus (q10yrs): UTD  - COVID-19: DUE, DECLINED   Lifestyle Modification:  - Discussed DIET -   limit snacks, processed foods, sugary and greasy foods, fast foods. Increase healthy alternatives, whole grains, fruits vegetables.  - Encouraged to take daily multivitamin.    - Discussed EXERCISE -   Recommended weight training for bone health and 30 minutes of cardiovascular exercise 5-7 days a week.  - Encouraged pt to get yearly eye and dental exams          Vasculogenic erectile dysfunction    Relevant  Medications    sildenafil (Viagra) 50 mg tablet     Other Visit Diagnoses       Screening for prostate cancer        Relevant Orders    Prostate Specific Antigen, Screen    Colon cancer screening        Relevant Orders    Colonoscopy Screening; Average Risk Patient    Need for shingles vaccine        Relevant Orders    Zoster vaccine, recombinant, adult (SHINGRIX) (Completed)    Type 2 diabetes mellitus with microalbuminuria, without long-term current use of insulin (CMS/[facility])        Relevant Medications    finerenone (Kerendia) 10 mg tablet tablet    tirzepatide (Mounjaro) 2.5 mg/0.5 mL pen injector    dapagliflozin propanediol (Farxiga) 10 mg    Other Relevant Orders    Follow Up In [facility] - Pharmacy

## 2023-12-12 NOTE — ASSESSMENT & PLAN NOTE
PREVENTIVE CARE SCREENING:  - Home life is good, lives in [facility] with fiance   - Work life is stressful -    - Labs:  DUE, ordered today   - PSA (men 50-59, q2 yrs): DUE, ordered   - Cologuard/ Colonoscopy: DUE  Vaccines:   - Flu: DUE, DECLINED  - Shingles Vaccine (start at 50): Updated today   - Tetanus (q10yrs): UTD  - COVID-19: DUE, DECLINED   Lifestyle Modification:  - Discussed DIET -   limit snacks, processed foods, sugary and greasy foods, fast foods. Increase healthy alternatives, whole grains, fruits vegetables.  - Encouraged to take daily multivitamin.    - Discussed EXERCISE -   Recommended weight training for bone health and 30 minutes of cardiovascular exercise 5-7 days a week.  - Encouraged pt to get yearly eye and dental exams

## 2023-12-13 ASSESSMENT — ENCOUNTER SYMPTOMS
CONSTIPATION: 0
COUGH: 0
ABDOMINAL PAIN: 0
EYE PAIN: 0
ARTHRALGIAS: 0
DIARRHEA: 0
RHINORRHEA: 0
SHORTNESS OF BREATH: 0
NAUSEA: 0
BACK PAIN: 0
VOMITING: 0

## 2023-12-13 NOTE — ASSESSMENT & PLAN NOTE
- Discussed tx options with the pt   - He does have microalbuminuria so will add Kerendia - will need to check metabolic panel in 1 week   - Will also add Farxiga 10 mg and Mounjaro 2.5 mg qwk - may be able to taper off the Basaglar   - Referred to [facility] pharmacist   - Follow up with me in 3 months with labs prior

## 2023-12-19 ENCOUNTER — DOCUMENTATION (OUTPATIENT)
Dept: PRIMARY CARE | Facility: CLINIC | Age: 48
End: 2023-12-19
Payer: COMMERCIAL

## 2023-12-19 LAB
ESTIMATED AVERAGE GLUCOSE FOR HBA1C EXTERNAL: NORMAL
HEMOGLOBIN A1C/HEMOGLOBIN TOTAL IN BLOOD EXTERNAL: 7.9 %

## 2023-12-29 ENCOUNTER — LAB (OUTPATIENT)
Dept: LAB | Facility: LAB | Age: 48
End: 2023-12-29
Payer: COMMERCIAL

## 2023-12-29 DIAGNOSIS — Z79.4 TYPE 2 DIABETES MELLITUS WITH HYPERGLYCEMIA, WITH LONG-TERM CURRENT USE OF INSULIN (MULTI): ICD-10-CM

## 2023-12-29 DIAGNOSIS — E11.65 TYPE 2 DIABETES MELLITUS WITH HYPERGLYCEMIA, WITH LONG-TERM CURRENT USE OF INSULIN (MULTI): ICD-10-CM

## 2023-12-29 DIAGNOSIS — Z12.5 SCREENING FOR PROSTATE CANCER: ICD-10-CM

## 2023-12-29 LAB
C PEPTIDE SERPL-MCNC: 5.7 NG/ML (ref 0.7–3.9)
CHOLEST SERPL-MCNC: 154 MG/DL (ref 0–199)
CHOLESTEROL/HDL RATIO: 3.2
CREAT UR-MCNC: 55.2 MG/DL (ref 20–370)
EST. AVERAGE GLUCOSE BLD GHB EST-MCNC: 217 MG/DL
HBA1C MFR BLD: 9.2 %
HDLC SERPL-MCNC: 47.9 MG/DL
LDLC SERPL CALC-MCNC: 39 MG/DL
MICROALBUMIN UR-MCNC: 8.9 MG/L
MICROALBUMIN/CREAT UR: 16.1 UG/MG CREAT
NON HDL CHOLESTEROL: 106 MG/DL (ref 0–149)
PSA SERPL-MCNC: 0.8 NG/ML
TRIGL SERPL-MCNC: 337 MG/DL (ref 0–149)
VLDL: 67 MG/DL (ref 0–40)

## 2023-12-29 PROCEDURE — 83036 HEMOGLOBIN GLYCOSYLATED A1C: CPT

## 2023-12-29 PROCEDURE — 36415 COLL VENOUS BLD VENIPUNCTURE: CPT

## 2023-12-29 PROCEDURE — 82570 ASSAY OF URINE CREATININE: CPT

## 2023-12-29 PROCEDURE — 80061 LIPID PANEL: CPT

## 2023-12-29 PROCEDURE — 82043 UR ALBUMIN QUANTITATIVE: CPT

## 2023-12-29 PROCEDURE — 84681 ASSAY OF C-PEPTIDE: CPT

## 2023-12-29 PROCEDURE — 84153 ASSAY OF PSA TOTAL: CPT

## 2024-01-08 ENCOUNTER — OFFICE VISIT (OUTPATIENT)
Dept: PRIMARY CARE | Facility: CLINIC | Age: 49
End: 2024-01-08
Payer: COMMERCIAL

## 2024-01-08 VITALS
HEIGHT: 72 IN | TEMPERATURE: 97.7 F | HEART RATE: 92 BPM | DIASTOLIC BLOOD PRESSURE: 88 MMHG | OXYGEN SATURATION: 98 % | WEIGHT: 216 LBS | RESPIRATION RATE: 18 BRPM | SYSTOLIC BLOOD PRESSURE: 124 MMHG | BODY MASS INDEX: 29.26 KG/M2

## 2024-01-08 DIAGNOSIS — E11.69 HYPERLIPIDEMIA ASSOCIATED WITH TYPE 2 DIABETES MELLITUS (MULTI): ICD-10-CM

## 2024-01-08 DIAGNOSIS — N52.9 VASCULOGENIC ERECTILE DYSFUNCTION, UNSPECIFIED VASCULOGENIC ERECTILE DYSFUNCTION TYPE: ICD-10-CM

## 2024-01-08 DIAGNOSIS — E11.65 TYPE 2 DIABETES MELLITUS WITH HYPERGLYCEMIA, WITH LONG-TERM CURRENT USE OF INSULIN (MULTI): Primary | ICD-10-CM

## 2024-01-08 DIAGNOSIS — E78.5 HYPERLIPIDEMIA ASSOCIATED WITH TYPE 2 DIABETES MELLITUS (MULTI): ICD-10-CM

## 2024-01-08 DIAGNOSIS — Z79.4 TYPE 2 DIABETES MELLITUS WITH HYPERGLYCEMIA, WITH LONG-TERM CURRENT USE OF INSULIN (MULTI): Primary | ICD-10-CM

## 2024-01-08 PROCEDURE — 4010F ACE/ARB THERAPY RXD/TAKEN: CPT | Performed by: PHYSICIAN ASSISTANT

## 2024-01-08 PROCEDURE — 99212 OFFICE O/P EST SF 10 MIN: CPT | Performed by: PHYSICIAN ASSISTANT

## 2024-01-08 PROCEDURE — 3079F DIAST BP 80-89 MM HG: CPT | Performed by: PHYSICIAN ASSISTANT

## 2024-01-08 PROCEDURE — 3074F SYST BP LT 130 MM HG: CPT | Performed by: PHYSICIAN ASSISTANT

## 2024-01-08 PROCEDURE — 1036F TOBACCO NON-USER: CPT | Performed by: PHYSICIAN ASSISTANT

## 2024-01-08 RX ORDER — SILDENAFIL 100 MG/1
100 TABLET, FILM COATED ORAL DAILY PRN
Qty: 12 TABLET | Refills: 3 | Status: SHIPPED | OUTPATIENT
Start: 2024-01-08 | End: 2024-06-10 | Stop reason: SDUPTHER

## 2024-01-08 NOTE — PROGRESS NOTES
"Subjective   Patient ID: Get Hernandez is a 48 y.o. male who presents for Follow-up (Pt here today for a follow up to discuss medications and to soon to get 2nd Shingles vaccine-pt was informed has to be at least 2 months after first one, has only been 1 month. Pt wants to know when to take meds/insulin for his diabetes; only using the Baslagar. Viagra isnt working either. ).    HPI     T2DM   - needs help with how to take Mounjaro     Erectile dysfunction   - only minimal response with Viagra 50 mg     Objective   /88   Pulse 92   Temp 36.5 °C (97.7 °F)   Resp 18   Ht 1.816 m (5' 11.5\")   Wt 98 kg (216 lb)   SpO2 98%   BMI 29.71 kg/m²     Physical Exam  Constitutional:       Appearance: Normal appearance.   Neurological:      Mental Status: He is alert.   Psychiatric:         Mood and Affect: Mood normal.         Behavior: Behavior normal.         Thought Content: Thought content normal.         Judgment: Judgment normal.       Assessment/Plan     Problem List Items Addressed This Visit       Type 2 diabetes mellitus with hyperglycemia, with long-term current use of insulin (CMS/[facility]) - Primary    Overview     - Most recent A1c was 9.2% (12/29/23)   - C-peptide 5.7 (12/29/23)  - Currently on Basaglar 20U, Mounjaro 2.5 mg, Farxiga   - Historical med: metformin (s/e significant GI upset)  - Most recent eGFR was > 90 (11/25/23)  - Most recent UACr was 16.1 (12/29/23)   - Pt is on ACE-I - lisinopril 20 mg  - Pt is on statin - atorvastatin 40 mg   - NOT on aspirin          Current Assessment & Plan     - Follow up from last visit with me last month - I sent over Mounjaro and Farxiga - pt was confused on how to take Mounjaro and wants instructions  - I used a demo pen to educate pt on use of Mounjaro today   - I instructed him to monitor sugars closely as there is risk of hypoglycemia when adding Mounjaro to insulin. If sugars dropping, taper down insulin by 5U per dose until sugars in range - may be able " to come off the insulin altogether (high C-peptide in most recent labs).   - I educated about hypoglycemia tx plan - he will keep sugar on him if needed and glucometer          Hyperlipidemia associated with type 2 diabetes mellitus (CMS/[facility])    Overview     - On statin - Lipitor 40 mg   - Most recent LDL 39, HDL 47.9, ratio 3.2, trigs 337 (12/29/23)         Current Assessment & Plan     - Continue current tx plan         Vasculogenic erectile dysfunction    Current Assessment & Plan     - Poor response to Viagra 50 mg   - Plan is to increase to Viagra 100 mg prn          Relevant Medications    sildenafil (Viagra) 100 mg tablet

## 2024-01-08 NOTE — ASSESSMENT & PLAN NOTE
- Follow up from last visit with me last month - I sent over Mounjaro and Farxiga - pt was confused on how to take Mounjaro and wants instructions  - I used a demo pen to educate pt on use of Mounjaro today   - I instructed him to monitor sugars closely as there is risk of hypoglycemia when adding Mounjaro to insulin. If sugars dropping, taper down insulin by 5U per dose until sugars in range - may be able to come off the insulin altogether (high C-peptide in most recent labs).   - I educated about hypoglycemia tx plan - he will keep sugar on him if needed and glucometer

## 2024-01-29 ENCOUNTER — TELEPHONE (OUTPATIENT)
Dept: PRIMARY CARE | Facility: CLINIC | Age: 49
End: 2024-01-29
Payer: COMMERCIAL

## 2024-01-29 NOTE — TELEPHONE ENCOUNTER
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  The paperwork is basically to show he is managing his diabetes and on medication.  He has already done his DOT physical down in [place].  He needs this paperwork done or he can't go back to work.

## 2024-01-30 ENCOUNTER — OFFICE VISIT (OUTPATIENT)
Dept: PRIMARY CARE | Facility: CLINIC | Age: 49
End: 2024-01-30
Payer: COMMERCIAL

## 2024-01-30 VITALS — SYSTOLIC BLOOD PRESSURE: 106 MMHG | DIASTOLIC BLOOD PRESSURE: 78 MMHG

## 2024-01-30 DIAGNOSIS — E11.65 TYPE 2 DIABETES MELLITUS WITH HYPERGLYCEMIA, WITH LONG-TERM CURRENT USE OF INSULIN (MULTI): Primary | ICD-10-CM

## 2024-01-30 DIAGNOSIS — I15.2 HYPERTENSION ASSOCIATED WITH TYPE 2 DIABETES MELLITUS (MULTI): ICD-10-CM

## 2024-01-30 DIAGNOSIS — E11.59 HYPERTENSION ASSOCIATED WITH TYPE 2 DIABETES MELLITUS (MULTI): ICD-10-CM

## 2024-01-30 DIAGNOSIS — Z79.4 TYPE 2 DIABETES MELLITUS WITH HYPERGLYCEMIA, WITH LONG-TERM CURRENT USE OF INSULIN (MULTI): Primary | ICD-10-CM

## 2024-01-30 PROCEDURE — 4010F ACE/ARB THERAPY RXD/TAKEN: CPT | Performed by: PHYSICIAN ASSISTANT

## 2024-01-30 PROCEDURE — 3074F SYST BP LT 130 MM HG: CPT | Performed by: PHYSICIAN ASSISTANT

## 2024-01-30 PROCEDURE — 99213 OFFICE O/P EST LOW 20 MIN: CPT | Performed by: PHYSICIAN ASSISTANT

## 2024-01-30 PROCEDURE — 3078F DIAST BP <80 MM HG: CPT | Performed by: PHYSICIAN ASSISTANT

## 2024-01-30 PROCEDURE — 1036F TOBACCO NON-USER: CPT | Performed by: PHYSICIAN ASSISTANT

## 2024-01-30 RX ORDER — TIRZEPATIDE 5 MG/.5ML
5 INJECTION, SOLUTION SUBCUTANEOUS
Qty: 2 ML | Refills: 0 | Status: SHIPPED | OUTPATIENT
Start: 2024-01-30 | End: 2024-05-16 | Stop reason: SDUPTHER

## 2024-01-30 RX ORDER — FLASH GLUCOSE SENSOR
KIT MISCELLANEOUS
Qty: 2 EACH | Refills: 11 | Status: SHIPPED | OUTPATIENT
Start: 2024-01-30

## 2024-01-30 RX ORDER — LOSARTAN POTASSIUM 50 MG/1
50 TABLET ORAL DAILY
Qty: 90 TABLET | Refills: 1 | Status: SHIPPED | OUTPATIENT
Start: 2024-01-30 | End: 2024-05-16 | Stop reason: SDUPTHER

## 2024-01-30 RX ORDER — BLOOD-GLUCOSE SENSOR
EACH MISCELLANEOUS
Qty: 1 EACH | Refills: 0 | Status: SHIPPED | OUTPATIENT
Start: 2024-01-30

## 2024-01-30 RX ORDER — FLASH GLUCOSE SCANNING READER
EACH MISCELLANEOUS
Qty: 1 EACH | Refills: 0 | Status: SHIPPED | OUTPATIENT
Start: 2024-01-30

## 2024-01-30 ASSESSMENT — ENCOUNTER SYMPTOMS
APPETITE CHANGE: 0
SHORTNESS OF BREATH: 0
UNEXPECTED WEIGHT CHANGE: 0
CHEST TIGHTNESS: 0
POLYDIPSIA: 0

## 2024-01-30 NOTE — ASSESSMENT & PLAN NOTE
- Sugars improving - home readings 110s in the mornings now   - Will taper up Mounjaro to 5 mg qwk   - Likely will need to start tapering down insulin - watch closely. Plan is to continue to taper up Mounjaro until off insulin altogether   - Will send CGM Latonya for better glucose monitoring   - See me in March for follow up with labs prior

## 2024-01-30 NOTE — ASSESSMENT & PLAN NOTE
- Pt c/o new chronic dry cough, not feeling ill. I am suspecting this is due to his lisinopril.   - Will d/c lisinopril and start Losartan 50 mg

## 2024-01-30 NOTE — PROGRESS NOTES
Subjective   Patient ID: Get Hernandez is a 48 y.o. male who presents for Follow-up (Pt here today for a follow up for DM2; needing forms filled out for work. ).    HPI     T2DM:   Checks sugar every monring - trending down - running about 110s in mornings now with Jardiance, Mounjaro 2.5 and Basaglar 20U   Has developed chronic dry cough - may be due to ace - change to arb   Increasing Mounjaro to 5 mg but likely will be able to taper down Basaglar by 2-3 U at that point depending on   Will start CGM per DOT regulations     Review of Systems   Constitutional:  Negative for appetite change and unexpected weight change.   Eyes:  Negative for visual disturbance.   Respiratory:  Negative for chest tightness and shortness of breath.    Cardiovascular:  Negative for chest pain and leg swelling.   Endocrine: Negative for polydipsia and polyuria.       Objective   /78 (BP Location: Left arm)     Physical Exam  Constitutional:       Appearance: Normal appearance.   Cardiovascular:      Rate and Rhythm: Normal rate.   Musculoskeletal:      Right foot: Normal range of motion. No deformity or bunion.      Left foot: Normal range of motion. No deformity or bunion.   Feet:      Right foot:      Protective Sensation: 10 sites tested.  10 sites sensed.      Skin integrity: Skin integrity normal. No ulcer, blister, skin breakdown or erythema.      Toenail Condition: Right toenails are normal.      Left foot:      Protective Sensation: 10 sites tested.  10 sites sensed.      Skin integrity: Skin integrity normal. No ulcer, blister, skin breakdown or erythema.      Toenail Condition: Left toenails are normal.   Neurological:      Mental Status: He is alert.         Assessment/Plan     Problem List Items Addressed This Visit       Type 2 diabetes mellitus with hyperglycemia, with long-term current use of insulin (CMS/[facility]) - Primary    Overview     - Most recent A1c was 9.2% (12/29/23)   - C-peptide 5.7 (12/29/23)  - Currently  on Basaglar 20U, Mounjaro 2.5 mg, Farxiga   - Historical med: metformin (s/e significant GI upset)  - Most recent eGFR was > 90 (11/25/23)  - Most recent UACr was 16.1 (12/29/23)   - Pt is on ACE-I - lisinopril 20 mg  - Pt is on statin - atorvastatin 40 mg   - NOT on aspirin          Current Assessment & Plan     - Sugars improving - home readings 110s in the mornings now   - Will taper up Mounjaro to 5 mg qwk   - Likely will need to start tapering down insulin - watch closely. Plan is to continue to taper up Mounjaro until off insulin altogether   - Will send CGM Latonya for better glucose monitoring   - See me in March for follow up with labs prior         Relevant Medications    tirzepatide (Mounjaro) 5 mg/0.5 mL pen injector    FreeStyle Latonya reader (FreeStyle Latonya 2 Schulter) misc    blood-glucose sensor (FreeStyle Latonya 3 Sensor) device    FreeStyle Latonya sensor system (FreeStyle Latonya 14 Day Sensor) kit    Other Relevant Orders    Hemoglobin A1C    Hypertension associated with type 2 diabetes mellitus (CMS/[facility])    Overview     - Current med: losartan 50 mg   - Historical med: lisinopril 20 mg (cough)          Current Assessment & Plan     - Pt c/o new chronic dry cough, not feeling ill. I am suspecting this is due to his lisinopril.   - Will d/c lisinopril and start Losartan 50 mg          Relevant Medications    losartan (Cozaar) 50 mg tablet

## 2024-03-28 ENCOUNTER — APPOINTMENT (OUTPATIENT)
Dept: PRIMARY CARE | Facility: CLINIC | Age: 49
End: 2024-03-28
Payer: COMMERCIAL

## 2024-05-16 ENCOUNTER — OFFICE VISIT (OUTPATIENT)
Dept: PRIMARY CARE | Facility: CLINIC | Age: 49
End: 2024-05-16
Payer: COMMERCIAL

## 2024-05-16 VITALS
TEMPERATURE: 97.5 F | WEIGHT: 214 LBS | RESPIRATION RATE: 18 BRPM | DIASTOLIC BLOOD PRESSURE: 84 MMHG | OXYGEN SATURATION: 100 % | BODY MASS INDEX: 28.99 KG/M2 | HEIGHT: 72 IN | SYSTOLIC BLOOD PRESSURE: 128 MMHG | HEART RATE: 68 BPM

## 2024-05-16 DIAGNOSIS — E11.29 TYPE 2 DIABETES MELLITUS WITH MICROALBUMINURIA, WITHOUT LONG-TERM CURRENT USE OF INSULIN (MULTI): ICD-10-CM

## 2024-05-16 DIAGNOSIS — E78.5 HYPERLIPIDEMIA ASSOCIATED WITH TYPE 2 DIABETES MELLITUS (MULTI): Primary | ICD-10-CM

## 2024-05-16 DIAGNOSIS — Z23 NEED FOR SHINGLES VACCINE: ICD-10-CM

## 2024-05-16 DIAGNOSIS — E11.69 HYPERLIPIDEMIA ASSOCIATED WITH TYPE 2 DIABETES MELLITUS (MULTI): Primary | ICD-10-CM

## 2024-05-16 DIAGNOSIS — I15.2 HYPERTENSION ASSOCIATED WITH TYPE 2 DIABETES MELLITUS (MULTI): ICD-10-CM

## 2024-05-16 DIAGNOSIS — Z79.4 TYPE 2 DIABETES MELLITUS WITH HYPERGLYCEMIA, WITH LONG-TERM CURRENT USE OF INSULIN (MULTI): ICD-10-CM

## 2024-05-16 DIAGNOSIS — E11.65 TYPE 2 DIABETES MELLITUS WITH HYPERGLYCEMIA, WITH LONG-TERM CURRENT USE OF INSULIN (MULTI): ICD-10-CM

## 2024-05-16 DIAGNOSIS — E11.59 HYPERTENSION ASSOCIATED WITH TYPE 2 DIABETES MELLITUS (MULTI): ICD-10-CM

## 2024-05-16 DIAGNOSIS — R80.9 TYPE 2 DIABETES MELLITUS WITH MICROALBUMINURIA, WITHOUT LONG-TERM CURRENT USE OF INSULIN (MULTI): ICD-10-CM

## 2024-05-16 LAB — POC HEMOGLOBIN A1C: 8.1 % (ref 4.2–6.5)

## 2024-05-16 PROCEDURE — 3074F SYST BP LT 130 MM HG: CPT | Performed by: PHYSICIAN ASSISTANT

## 2024-05-16 PROCEDURE — 90750 HZV VACC RECOMBINANT IM: CPT | Performed by: PHYSICIAN ASSISTANT

## 2024-05-16 PROCEDURE — 3079F DIAST BP 80-89 MM HG: CPT | Performed by: PHYSICIAN ASSISTANT

## 2024-05-16 PROCEDURE — 4010F ACE/ARB THERAPY RXD/TAKEN: CPT | Performed by: PHYSICIAN ASSISTANT

## 2024-05-16 PROCEDURE — 83036 HEMOGLOBIN GLYCOSYLATED A1C: CPT | Performed by: PHYSICIAN ASSISTANT

## 2024-05-16 PROCEDURE — 99213 OFFICE O/P EST LOW 20 MIN: CPT | Performed by: PHYSICIAN ASSISTANT

## 2024-05-16 PROCEDURE — 1036F TOBACCO NON-USER: CPT | Performed by: PHYSICIAN ASSISTANT

## 2024-05-16 PROCEDURE — 90471 IMMUNIZATION ADMIN: CPT | Performed by: PHYSICIAN ASSISTANT

## 2024-05-16 RX ORDER — ATORVASTATIN CALCIUM 40 MG/1
40 TABLET, FILM COATED ORAL NIGHTLY
Qty: 90 TABLET | Refills: 1 | Status: SHIPPED | OUTPATIENT
Start: 2024-05-16

## 2024-05-16 RX ORDER — DAPAGLIFLOZIN 10 MG/1
TABLET, FILM COATED ORAL
Qty: 90 TABLET | Refills: 1 | Status: SHIPPED | OUTPATIENT
Start: 2024-05-16

## 2024-05-16 RX ORDER — TIRZEPATIDE 5 MG/.5ML
5 INJECTION, SOLUTION SUBCUTANEOUS
Qty: 2 ML | Refills: 3 | Status: SHIPPED | OUTPATIENT
Start: 2024-05-19

## 2024-05-16 RX ORDER — INSULIN GLARGINE 100 [IU]/ML
20 INJECTION, SOLUTION SUBCUTANEOUS EVERY MORNING
Qty: 9 ML | Refills: 1 | Status: SHIPPED | OUTPATIENT
Start: 2024-05-16

## 2024-05-16 RX ORDER — LOSARTAN POTASSIUM 50 MG/1
50 TABLET ORAL DAILY
Qty: 90 TABLET | Refills: 1 | Status: SHIPPED | OUTPATIENT
Start: 2024-05-16

## 2024-05-16 ASSESSMENT — ENCOUNTER SYMPTOMS
SHORTNESS OF BREATH: 0
POLYDIPSIA: 0
APPETITE CHANGE: 0
CHEST TIGHTNESS: 0
UNEXPECTED WEIGHT CHANGE: 0

## 2024-05-16 NOTE — PROGRESS NOTES
"Subjective   Patient ID: Get Hernandez is a 48 y.o. male who presents for Follow-up (Pt here today for a follow up for DM2 and needing refills-has been off on Mounjaro for about a month now. ).    HPI     T2DM  - Sugars improved but ran out of Mounjaro and didn't call for refill so has been out of that and only taking insulin and Farxiga   - Felt like appetite was much better on Mounjaro     HTN   - Usually good at home       Review of Systems   Constitutional:  Negative for appetite change and unexpected weight change.   Eyes:  Negative for visual disturbance.   Respiratory:  Negative for chest tightness and shortness of breath.    Cardiovascular:  Negative for chest pain and leg swelling.   Endocrine: Negative for polydipsia and polyuria.       Objective   /84   Pulse 68   Temp 36.4 °C (97.5 °F)   Resp 18   Ht 1.816 m (5' 11.5\")   Wt 97.1 kg (214 lb)   SpO2 100%   BMI 29.43 kg/m²     Physical Exam  Constitutional:       General: He is not in acute distress.     Appearance: Normal appearance. He is not ill-appearing.   HENT:      Head: Normocephalic.   Cardiovascular:      Rate and Rhythm: Normal rate and regular rhythm.      Pulses: Normal pulses.      Heart sounds: Normal heart sounds. No murmur heard.  Pulmonary:      Effort: Pulmonary effort is normal.      Breath sounds: Normal breath sounds.   Neurological:      Mental Status: He is alert.   Psychiatric:         Mood and Affect: Mood normal.         Behavior: Behavior normal.         Assessment/Plan     Problem List Items Addressed This Visit       Type 2 diabetes mellitus with hyperglycemia, with long-term current use of insulin (Multi)    Overview     - Most recent A1c was 8.1% (5/16/24) down from 9.2% prior   - C-peptide 5.7 (12/29/23)  - Currently on Basaglar 20U, Mounjaro 2.5 mg, Farxiga 10 mg  - Historical med: metformin (s/e significant GI upset)  - Most recent eGFR was > 90 (11/25/23)  - Most recent UACr was 16.1 (12/29/23)   - Pt is on " ACE-I - lisinopril 20 mg  - Pt is on statin - atorvastatin 40 mg   - NOT on aspirin          Current Assessment & Plan     - Sugars have improved slightly but still above goal   - He admits he only took one month of Mounjaro 5 mg and then didn't call for refill (thought he had to come back in)   - Advised he take Mounjaro consistently and call for refill when out so we can taper up the dose every month until his next appt in 3 months - he expressed understanding and agreed  - Otherwise, will keep meds the same  - Will refer to [facility] pharmacist to help facilitate          Relevant Medications    tirzepatide (Mounjaro) 5 mg/0.5 mL pen injector (Start on 5/19/2024)    insulin glargine (Basaglar KwikPen U-100 Insulin) 100 unit/mL (3 mL) pen    atorvastatin (Lipitor) 40 mg tablet    Other Relevant Orders    POCT glycosylated hemoglobin (Hb A1C) manually resulted (Completed)    Follow Up In [facility] - Pharmacy    Hypertension associated with type 2 diabetes mellitus (Multi)    Overview     - Current med: losartan 50 mg   - Historical med: lisinopril 20 mg (cough)          Current Assessment & Plan     - Stable, continue current tx plan          Relevant Medications    losartan (Cozaar) 50 mg tablet    Hyperlipidemia associated with type 2 diabetes mellitus (Multi) - Primary    Overview     - Current med: Lipitor 40 mg   - Most recent LDL 39, HDL 47.9, ratio 3.2, trigs 337 (12/29/23)         Current Assessment & Plan     - Stable, continue current tx plan          Relevant Medications    atorvastatin (Lipitor) 40 mg tablet     Other Visit Diagnoses       Type 2 diabetes mellitus with microalbuminuria, without long-term current use of insulin (Multi)        Relevant Medications    dapagliflozin propanediol (Farxiga) 10 mg    Other Relevant Orders    Follow Up In [facility] - Pharmacy    Need for shingles vaccine        Relevant Orders    Zoster vaccine, recombinant, adult (SHINGRIX) (Completed)

## 2024-05-16 NOTE — ASSESSMENT & PLAN NOTE
- Sugars have improved slightly but still above goal   - He admits he only took one month of Mounjaro 5 mg and then didn't call for refill (thought he had to come back in)   - Advised he take Mounjaro consistently and call for refill when out so we can taper up the dose every month until his next appt in 3 months - he expressed understanding and agreed  - Otherwise, will keep meds the same  - Will refer to [facility] pharmacist to help facilitate

## 2024-06-10 DIAGNOSIS — N52.9 VASCULOGENIC ERECTILE DYSFUNCTION, UNSPECIFIED VASCULOGENIC ERECTILE DYSFUNCTION TYPE: ICD-10-CM

## 2024-06-10 RX ORDER — SILDENAFIL 100 MG/1
100 TABLET, FILM COATED ORAL DAILY PRN
Qty: 12 TABLET | Refills: 3 | Status: SHIPPED | OUTPATIENT
Start: 2024-06-10 | End: 2025-06-10

## 2024-06-10 NOTE — TELEPHONE ENCOUNTER
Pt jovita Gonzalez's requesting refill  Sildenafil 100mg, 1 tab daily PRN  DM Aj  Pt's # [phone]

## 2024-08-04 DIAGNOSIS — Z79.4 TYPE 2 DIABETES MELLITUS WITH HYPERGLYCEMIA, WITH LONG-TERM CURRENT USE OF INSULIN (MULTI): ICD-10-CM

## 2024-08-04 DIAGNOSIS — E11.65 TYPE 2 DIABETES MELLITUS WITH HYPERGLYCEMIA, WITH LONG-TERM CURRENT USE OF INSULIN (MULTI): ICD-10-CM

## 2024-08-05 RX ORDER — INSULIN GLARGINE 100 [IU]/ML
20 INJECTION, SOLUTION SUBCUTANEOUS EVERY MORNING
Qty: 15 ML | Refills: 1 | Status: SHIPPED | OUTPATIENT
Start: 2024-08-05

## 2024-09-18 DIAGNOSIS — E11.65 TYPE 2 DIABETES MELLITUS WITH HYPERGLYCEMIA, WITH LONG-TERM CURRENT USE OF INSULIN: ICD-10-CM

## 2024-09-18 DIAGNOSIS — R80.9 TYPE 2 DIABETES MELLITUS WITH MICROALBUMINURIA, WITHOUT LONG-TERM CURRENT USE OF INSULIN (MULTI): ICD-10-CM

## 2024-09-18 DIAGNOSIS — Z79.4 TYPE 2 DIABETES MELLITUS WITH HYPERGLYCEMIA, WITH LONG-TERM CURRENT USE OF INSULIN: ICD-10-CM

## 2024-09-18 DIAGNOSIS — E11.29 TYPE 2 DIABETES MELLITUS WITH MICROALBUMINURIA, WITHOUT LONG-TERM CURRENT USE OF INSULIN (MULTI): ICD-10-CM

## 2024-09-18 RX ORDER — INSULIN GLARGINE 100 [IU]/ML
30 INJECTION, SOLUTION SUBCUTANEOUS EVERY MORNING
Qty: 15 ML | Refills: 3 | Status: SHIPPED | OUTPATIENT
Start: 2024-09-18

## 2024-09-18 RX ORDER — DAPAGLIFLOZIN 10 MG/1
TABLET, FILM COATED ORAL
Qty: 90 TABLET | Refills: 1 | Status: SHIPPED | OUTPATIENT
Start: 2024-09-18

## 2024-09-18 NOTE — TELEPHONE ENCOUNTER
Patient phones the office today w/ request to refill the following medications to [facility] on [facility] in [place].     *Basaglar Kwikpen, Inject 20 units QAM  (patient states he has been injecting 30 units due to his sugar level)     *Dapagliflozin Propanediol (Farxiga) 10mg 1 TAB every day     Thank you.

## 2024-11-15 DIAGNOSIS — E11.59 HYPERTENSION ASSOCIATED WITH TYPE 2 DIABETES MELLITUS (MULTI): ICD-10-CM

## 2024-11-15 DIAGNOSIS — I15.2 HYPERTENSION ASSOCIATED WITH TYPE 2 DIABETES MELLITUS (MULTI): ICD-10-CM

## 2024-11-15 RX ORDER — LOSARTAN POTASSIUM 50 MG/1
50 TABLET ORAL DAILY
Qty: 90 TABLET | Refills: 1 | Status: SHIPPED | OUTPATIENT
Start: 2024-11-15

## 2025-02-10 ENCOUNTER — APPOINTMENT (OUTPATIENT)
Dept: PRIMARY CARE | Facility: CLINIC | Age: 50
End: 2025-02-10
Payer: COMMERCIAL

## 2025-02-10 VITALS
HEIGHT: 72 IN | SYSTOLIC BLOOD PRESSURE: 110 MMHG | DIASTOLIC BLOOD PRESSURE: 70 MMHG | WEIGHT: 214.9 LBS | HEART RATE: 79 BPM | BODY MASS INDEX: 29.11 KG/M2 | RESPIRATION RATE: 16 BRPM | OXYGEN SATURATION: 97 % | TEMPERATURE: 97.7 F

## 2025-02-10 DIAGNOSIS — E11.69 HYPERLIPIDEMIA ASSOCIATED WITH TYPE 2 DIABETES MELLITUS (MULTI): ICD-10-CM

## 2025-02-10 DIAGNOSIS — I15.2 HYPERTENSION ASSOCIATED WITH TYPE 2 DIABETES MELLITUS (MULTI): ICD-10-CM

## 2025-02-10 DIAGNOSIS — Z00.00 ANNUAL PHYSICAL EXAM: Primary | ICD-10-CM

## 2025-02-10 DIAGNOSIS — N52.9 VASCULOGENIC ERECTILE DYSFUNCTION, UNSPECIFIED VASCULOGENIC ERECTILE DYSFUNCTION TYPE: ICD-10-CM

## 2025-02-10 DIAGNOSIS — Z79.4 TYPE 2 DIABETES MELLITUS WITH HYPERGLYCEMIA, WITH LONG-TERM CURRENT USE OF INSULIN: ICD-10-CM

## 2025-02-10 DIAGNOSIS — E11.65 TYPE 2 DIABETES MELLITUS WITH HYPERGLYCEMIA, WITH LONG-TERM CURRENT USE OF INSULIN: ICD-10-CM

## 2025-02-10 DIAGNOSIS — E11.59 HYPERTENSION ASSOCIATED WITH TYPE 2 DIABETES MELLITUS (MULTI): ICD-10-CM

## 2025-02-10 DIAGNOSIS — E78.5 HYPERLIPIDEMIA ASSOCIATED WITH TYPE 2 DIABETES MELLITUS (MULTI): ICD-10-CM

## 2025-02-10 LAB — POC HEMOGLOBIN A1C: 8.6 % (ref 4.2–6.5)

## 2025-02-10 PROCEDURE — 3078F DIAST BP <80 MM HG: CPT | Performed by: PHYSICIAN ASSISTANT

## 2025-02-10 PROCEDURE — 4010F ACE/ARB THERAPY RXD/TAKEN: CPT | Performed by: PHYSICIAN ASSISTANT

## 2025-02-10 PROCEDURE — 83036 HEMOGLOBIN GLYCOSYLATED A1C: CPT | Performed by: PHYSICIAN ASSISTANT

## 2025-02-10 PROCEDURE — 3074F SYST BP LT 130 MM HG: CPT | Performed by: PHYSICIAN ASSISTANT

## 2025-02-10 PROCEDURE — 3008F BODY MASS INDEX DOCD: CPT | Performed by: PHYSICIAN ASSISTANT

## 2025-02-10 PROCEDURE — 99396 PREV VISIT EST AGE 40-64: CPT | Performed by: PHYSICIAN ASSISTANT

## 2025-02-10 PROCEDURE — 1036F TOBACCO NON-USER: CPT | Performed by: PHYSICIAN ASSISTANT

## 2025-02-10 RX ORDER — SILDENAFIL 100 MG/1
100 TABLET, FILM COATED ORAL DAILY PRN
Qty: 12 TABLET | Refills: 3 | Status: SHIPPED | OUTPATIENT
Start: 2025-02-10 | End: 2026-02-10

## 2025-02-10 RX ORDER — ATORVASTATIN CALCIUM 40 MG/1
40 TABLET, FILM COATED ORAL NIGHTLY
Qty: 90 TABLET | Refills: 1 | Status: SHIPPED | OUTPATIENT
Start: 2025-02-10

## 2025-02-10 RX ORDER — LOSARTAN POTASSIUM 50 MG/1
50 TABLET ORAL DAILY
Qty: 90 TABLET | Refills: 1 | Status: SHIPPED | OUTPATIENT
Start: 2025-02-10

## 2025-02-10 RX ORDER — TIRZEPATIDE 2.5 MG/.5ML
2.5 INJECTION, SOLUTION SUBCUTANEOUS WEEKLY
Qty: 2 ML | Refills: 0 | Status: SHIPPED | OUTPATIENT
Start: 2025-02-10

## 2025-02-10 RX ORDER — DAPAGLIFLOZIN 10 MG/1
TABLET, FILM COATED ORAL
Qty: 90 TABLET | Refills: 1 | Status: SHIPPED | OUTPATIENT
Start: 2025-02-10 | End: 2025-02-10 | Stop reason: SDUPTHER

## 2025-02-10 RX ORDER — DAPAGLIFLOZIN 10 MG/1
TABLET, FILM COATED ORAL
Qty: 90 TABLET | Refills: 1 | Status: SHIPPED | OUTPATIENT
Start: 2025-02-10

## 2025-02-10 RX ORDER — INSULIN GLARGINE 100 [IU]/ML
30 INJECTION, SOLUTION SUBCUTANEOUS EVERY MORNING
Qty: 15 ML | Refills: 3 | Status: SHIPPED | OUTPATIENT
Start: 2025-02-10

## 2025-02-10 ASSESSMENT — ENCOUNTER SYMPTOMS
SORE THROAT: 0
SINUS PAIN: 0
DYSURIA: 0
DIARRHEA: 0
APPETITE CHANGE: 0
ABDOMINAL PAIN: 0
DIZZINESS: 0
CHEST TIGHTNESS: 0
SHORTNESS OF BREATH: 0
VOMITING: 0
FEVER: 0
NAUSEA: 0
NERVOUS/ANXIOUS: 0

## 2025-02-10 NOTE — ASSESSMENT & PLAN NOTE
PREVENTIVE CARE SCREENING:  - Labs: UTD through work, requested he send me records   - PSA (men 50-59, q2 yrs): UTD  - Cologuard/ Colonoscopy: UTD  Vaccines:   - Shingles Vaccine (start at 50): UTD  - Tetanus (q10yrs): UTD  Lifestyle Modification:  - Discussed DIET -   limit snacks, processed foods, sugary and greasy foods, fast foods. Increase healthy alternatives, whole grains, fruits vegetables.  - Encouraged to take daily multivitamin.    - Discussed EXERCISE -   Recommended weight training for bone health and 30 minutes of cardiovascular exercise 5-7 days a week.  - Encouraged pt to get yearly eye and dental exams

## 2025-02-10 NOTE — ASSESSMENT & PLAN NOTE
- Sugars are WORSENING. Pt is aware of risks of this. Admits to poor diet. Encouraged dietary and lifestyle modification.   - Manage T2DM with Basaglar 100U, Mounjaro 2.5 mg, Farxiga 10 mg and diet and exercise. Check sugars regularly. Added Mounjaro and Farxiga back to assist in lowering sugars. Will taper down insulin as we taper up Mounjaro  - Follow up in 3 months

## 2025-02-10 NOTE — ASSESSMENT & PLAN NOTE
Continue Lipitor 40 mg. Pt recently had labs. Pt is sending records over. Check pt's paperwork and adjust dosage if necessary.

## 2025-02-10 NOTE — PROGRESS NOTES
"Subjective   Patient ID: Get Hernandez is a 49 y.o. male who presents for PAPERWORK  (Diabetes paperwork ).    HPI     Preventive:   - Labs: Last labs 12/2023  - Colon CA: 6/23/2018 due in 2028  - Shingrix: utd   - Pneumovax/ Prevnar: utd   - Td: UTD   - Diet: Carnivore Keto diet for the last 3-4 wks, trying to eliminate sugar and carbs from diet  - insurance paying for glucose monitor    - Exercise: not currently, walks on weekends when he can   - Sexual hx: yes, on Viagra  - Tobacco: quit 2020   - Illicit drugs: none  - Alcohol: none  - Mood: good  - Dentist visits: up to date  - Eye exams: 1/31/2025       T2DM  - A1c today is 8.6%  - Using insulin once a day   - Diet has been bad (marv around holidays)  - New year resolution is to lose weight and get back to being healthy   - Needs a happy medium with the diet       HLD   - Taking atorvastatin regularly  - No adverse effects      HTN:   - Taking Losartan regularly   - No adverse effects         Review of Systems   Constitutional:  Negative for appetite change and fever.   HENT:  Negative for congestion, sinus pain and sore throat.    Respiratory:  Negative for chest tightness and shortness of breath.    Cardiovascular:  Negative for chest pain.   Gastrointestinal:  Negative for abdominal pain, diarrhea, nausea and vomiting.   Genitourinary:  Negative for dysuria.   Skin:  Negative for rash.   Neurological:  Negative for dizziness.   Psychiatric/Behavioral:  The patient is not nervous/anxious.        Objective   /70   Pulse 79   Temp 36.5 °C (97.7 °F) (Temporal)   Resp 16   Ht 1.816 m (5' 11.5\")   Wt 97.5 kg (214 lb 14.4 oz)   SpO2 97%   BMI 29.55 kg/m²     Physical Exam  Constitutional:       General: He is not in acute distress.     Appearance: Normal appearance. He is not ill-appearing.   HENT:      Right Ear: Tympanic membrane, ear canal and external ear normal.      Left Ear: Tympanic membrane, ear canal and external ear normal.      Nose: No " congestion.      Mouth/Throat:      Mouth: Mucous membranes are moist.      Pharynx: Oropharynx is clear. No oropharyngeal exudate or posterior oropharyngeal erythema.   Cardiovascular:      Pulses: Normal pulses.      Heart sounds: Normal heart sounds.   Pulmonary:      Effort: Pulmonary effort is normal. No respiratory distress.      Breath sounds: Normal breath sounds. No stridor. No wheezing, rhonchi or rales.   Abdominal:      General: Bowel sounds are normal.   Feet:      Right foot:      Protective Sensation: 10 sites tested.  10 sites sensed.      Skin integrity: No ulcer or skin breakdown.      Left foot:      Protective Sensation: 10 sites tested.  10 sites sensed.      Skin integrity: No ulcer or skin breakdown.   Skin:     General: Skin is warm and dry.      Findings: No wound.   Neurological:      Mental Status: He is alert.   Psychiatric:         Mood and Affect: Mood normal.         Assessment/Plan     Problem List Items Addressed This Visit       Type 2 diabetes mellitus with hyperglycemia, with long-term current use of insulin    Overview     - Most recent A1c was 8.6% (2/10/2025), up from 8.1% prior  - C-peptide 5.7 (12/29/23)  - Currently on Basaglar 100U, Mounjaro 2.5 mg, Farxiga 10 mg  - Was not on Mounjaro at 2/10/2025, renewed rx as pt has insurance now  - Historical med: metformin (s/e significant GI upset)  - Most recent eGFR was > 90 (11/25/23)  - Most recent UACr was 16.1 (12/29/23)   - Pt is on ARB - losartan 50 mg  - Pt is on statin - atorvastatin 40 mg   - NOT on aspirin          Current Assessment & Plan     - Sugars are WORSENING. Pt is aware of risks of this. Admits to poor diet. Encouraged dietary and lifestyle modification.   - Manage T2DM with Basaglar 100U, Mounjaro 2.5 mg, Farxiga 10 mg and diet and exercise. Check sugars regularly. Added Mounjaro and Farxiga back to assist in lowering sugars. Will taper down insulin as we taper up Mounjaro  - Follow up in 3 months           Relevant Medications    atorvastatin (Lipitor) 40 mg tablet    insulin glargine (Basaglar KwikPen U-100 Insulin) 100 unit/mL (3 mL) pen    tirzepatide (Mounjaro) 2.5 mg/0.5 mL pen injector    dapagliflozin propanediol (Farxiga) 10 mg    Other Relevant Orders    POCT glycosylated hemoglobin (Hb A1C) manually resulted (Completed)    Hypertension associated with type 2 diabetes mellitus (Multi)    Overview     - Current med: losartan 50 mg   - Historical med: lisinopril 20 mg (cough)          Current Assessment & Plan     - BP is stable and well controlled  - Continue current tx plan         Relevant Medications    losartan (Cozaar) 50 mg tablet    Hyperlipidemia associated with type 2 diabetes mellitus (Multi)    Overview     - Current med: Lipitor 40 mg   - Most recent LDL 39, HDL 47.9, ratio 3.2, trigs 337 (12/29/23)  - STARTING KETO DIET 2/2025, WATCH LIPIDS         Current Assessment & Plan     Continue Lipitor 40 mg. Pt recently had labs. Pt is sending records over. Check pt's paperwork and adjust dosage if necessary.         Relevant Medications    atorvastatin (Lipitor) 40 mg tablet    Annual physical exam - Primary    Overview     - Lives in [facility] with marija   - Works as a    - Colonoscopy 6/23/18 - next due in 2028  - Tdap 6/6/21 - next due 6/2031  - Shingrix UTD         Current Assessment & Plan     PREVENTIVE CARE SCREENING:  - Labs: UTD through work, requested he send me records   - PSA (men 50-59, q2 yrs): UTD  - Cologuard/ Colonoscopy: UTD  Vaccines:   - Shingles Vaccine (start at 50): UTD  - Tetanus (q10yrs): UTD  Lifestyle Modification:  - Discussed DIET -   limit snacks, processed foods, sugary and greasy foods, fast foods. Increase healthy alternatives, whole grains, fruits vegetables.  - Encouraged to take daily multivitamin.    - Discussed EXERCISE -   Recommended weight training for bone health and 30 minutes of cardiovascular exercise 5-7 days a week.  - Encouraged pt to  get yearly eye and dental exams          Vasculogenic erectile dysfunction    Overview     Currently on sildenafil 100 mg.         Current Assessment & Plan     Continue sildenafil 100 mg.          Relevant Medications    sildenafil (Viagra) 100 mg tablet         I was present with the PA student Jyotsna Arteaga who participated in the documentation of this note. I have personally seen and re-examined the patient and performed the medical decision-making components (assessment and plan of care). I have reviewed the PA student documentation and verified the findings in the note as written with additions or exceptions as stated in the body of this note.    Lisa Mcdaniel PA-C

## 2025-02-17 ENCOUNTER — TELEPHONE (OUTPATIENT)
Dept: PHARMACY | Facility: HOSPITAL | Age: 50
End: 2025-02-17
Payer: COMMERCIAL

## 2025-02-17 DIAGNOSIS — E11.65 TYPE 2 DIABETES MELLITUS WITH HYPERGLYCEMIA, WITH LONG-TERM CURRENT USE OF INSULIN: Primary | ICD-10-CM

## 2025-02-17 DIAGNOSIS — Z79.4 TYPE 2 DIABETES MELLITUS WITH HYPERGLYCEMIA, WITH LONG-TERM CURRENT USE OF INSULIN: Primary | ICD-10-CM

## 2025-02-17 NOTE — TELEPHONE ENCOUNTER
LVM to notify Mounjaro prior auth was denied as patient must trial other medications first. Will pend Trulicity to PCP, and send [employer] message as well. Advised can call [facility] back at [phone] to discuss medication switch.    Thank you,  Nisha Mccullough, PharmD

## 2025-02-18 RX ORDER — DULAGLUTIDE 0.75 MG/.5ML
0.75 INJECTION, SOLUTION SUBCUTANEOUS WEEKLY
Qty: 2 ML | Refills: 0 | Status: SHIPPED | OUTPATIENT
Start: 2025-02-18

## 2025-05-16 ENCOUNTER — APPOINTMENT (OUTPATIENT)
Dept: PRIMARY CARE | Facility: CLINIC | Age: 50
End: 2025-05-16
Payer: COMMERCIAL